# Patient Record
Sex: MALE | NOT HISPANIC OR LATINO | Employment: UNEMPLOYED | URBAN - NONMETROPOLITAN AREA
[De-identification: names, ages, dates, MRNs, and addresses within clinical notes are randomized per-mention and may not be internally consistent; named-entity substitution may affect disease eponyms.]

---
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
## 2022-08-16 ENCOUNTER — PATIENT ROUNDING (BHMG ONLY) (OUTPATIENT)
Dept: INTERNAL MEDICINE | Facility: CLINIC | Age: 22
End: 2022-08-16

## 2022-08-16 ENCOUNTER — OFFICE VISIT (OUTPATIENT)
Dept: INTERNAL MEDICINE | Facility: CLINIC | Age: 22
End: 2022-08-16

## 2022-08-16 VITALS
BODY MASS INDEX: 23.66 KG/M2 | SYSTOLIC BLOOD PRESSURE: 142 MMHG | DIASTOLIC BLOOD PRESSURE: 88 MMHG | WEIGHT: 169 LBS | HEIGHT: 71 IN | OXYGEN SATURATION: 99 % | HEART RATE: 128 BPM

## 2022-08-16 DIAGNOSIS — F41.9 ANXIETY: ICD-10-CM

## 2022-08-16 DIAGNOSIS — N52.9 ERECTILE DYSFUNCTION, UNSPECIFIED ERECTILE DYSFUNCTION TYPE: Primary | ICD-10-CM

## 2022-08-16 DIAGNOSIS — Z86.16 HISTORY OF 2019 NOVEL CORONAVIRUS DISEASE (COVID-19): ICD-10-CM

## 2022-08-16 DIAGNOSIS — R00.0 TACHYCARDIA: ICD-10-CM

## 2022-08-16 PROCEDURE — 99214 OFFICE O/P EST MOD 30 MIN: CPT | Performed by: INTERNAL MEDICINE

## 2022-08-16 RX ORDER — BUSPIRONE HYDROCHLORIDE 10 MG/1
10 TABLET ORAL 2 TIMES DAILY
Qty: 60 TABLET | Refills: 3 | Status: SHIPPED | OUTPATIENT
Start: 2022-08-16 | End: 2022-11-16 | Stop reason: SINTOL

## 2022-08-16 NOTE — PROGRESS NOTES
"Chief Complaint   Patient presents with   • Establish Care   • Erectile Dysfunction     \"Since COVID\"       History:  Gregorio Bonenr is a 21 y.o. male who presents today for evaluation of the above problems.      Presents today to establish care for erectile dysfunction.  He had COVID at the beginning of July and ever since then he noticed ED issues.  Symptoms were so bad that he went to urgent care on July 26, 2022.  He states at first he was unable to get any erection at all..  Did not have any morning erections.  Now, he is able to get partial erection.  He denies any libido change.  He is in a monogamous relationship.  He reports the relationship to be good.  He is also had issues with erectile dysfunction with masturbation.    He does report having anxiety and is very anxious at the moment.  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  Today is the first day of school.  He will be starting to study for the MCAT soon as well.  He has always been anxious.    He denies any personal history of diabetes, hypertension, TIA, stroke or heart attack.  No family history of diabetes or hypertension either.    Urgent care referred to urology, but he could not be seen because of his young age.    He denies alcohol, drug or tobacco use    HPI    Social History     Socioeconomic History   • Marital status: Single   Tobacco Use   • Smoking status: Never Smoker   • Smokeless tobacco: Never Used   Vaping Use   • Vaping Use: Never used   Substance and Sexual Activity   • Alcohol use: Never   • Drug use: Never   • Sexual activity: Yes     Partners: Female     Birth control/protection: Condom       PHQ-9 Depression Screening  Little interest or pleasure in doing things? 1-->several days   Feeling down, depressed, or hopeless? 0-->not at all   Trouble falling or staying asleep, or sleeping too much?     Feeling tired or having little energy?     Poor appetite or overeating?     Feeling bad about yourself - or that you are a " "failure or have let yourself or your family down?     Trouble concentrating on things, such as reading the newspaper or watching television?     Moving or speaking so slowly that other people could have noticed? Or the opposite - being so fidgety or restless that you have been moving around a lot more than usual?     Thoughts that you would be better off dead, or of hurting yourself in some way?     PHQ-9 Total Score 1   If you checked off any problems, how difficult have these problems made it for you to do your work, take care of things at home, or get along with other people?         PHQ-9 Total Score: 1    ROS:  Review of Systems   Constitutional: Negative.    HENT: Negative.    Respiratory: Negative.    Cardiovascular: Negative.    Gastrointestinal: Negative.    Genitourinary: Negative for dysuria, penile discharge, penile pain, penile swelling, scrotal swelling and testicular pain.         Current Outpatient Medications:   •  busPIRone (BUSPAR) 10 MG tablet, Take 1 tablet by mouth 2 (Two) Times a Day., Disp: 60 tablet, Rfl: 3    No results found for: GLUCOSE, BUN, CREATININE, EGFRIFNONA, EGFRIFAFRI, BCR, K, CO2, CALCIUM, PROTENTOTREF, ALBUMIN, LABIL2, BILIRUBIN, AST, ALT    Hemoglobin   Date Value Ref Range Status   11/14/2019 15.6 12.0 - 17.0 g/dL Final         OBJECTIVE:  Visit Vitals  /88 (BP Location: Right arm, Patient Position: Sitting, Cuff Size: Adult)   Pulse (!) 128   Ht 180.3 cm (71\")   Wt 76.7 kg (169 lb)   SpO2 99%   BMI 23.57 kg/m²      Physical Exam  Vitals and nursing note reviewed.   Constitutional:       General: He is not in acute distress.     Appearance: He is well-developed. He is not ill-appearing or toxic-appearing.   HENT:      Head: Normocephalic and atraumatic.   Eyes:      Pupils: Pupils are equal, round, and reactive to light.   Neck:      Thyroid: No thyromegaly.      Trachea: Phonation normal.   Cardiovascular:      Rate and Rhythm: Regular rhythm. Tachycardia present. "   Pulmonary:      Effort: Pulmonary effort is normal. No respiratory distress.      Breath sounds: Normal breath sounds. No wheezing, rhonchi or rales.   Abdominal:      Palpations: Abdomen is soft.      Tenderness: There is no abdominal tenderness.      Hernia: There is no hernia in the left inguinal area or right inguinal area.   Genitourinary:     Penis: Uncircumcised. No swelling.       Testes: Normal.         Right: Mass or tenderness not present.         Left: Mass or tenderness not present.   Lymphadenopathy:      Lower Body: No right inguinal adenopathy. No left inguinal adenopathy.   Skin:     General: Skin is warm and dry.      Coloration: Skin is not jaundiced or pale.      Findings: No erythema.   Neurological:      Mental Status: He is alert.   Psychiatric:         Behavior: Behavior normal.         Thought Content: Thought content normal.         Judgment: Judgment normal.         Assessment/Plan    Diagnoses and all orders for this visit:    1. Erectile dysfunction, unspecified erectile dysfunction type (Primary)  -     Comprehensive metabolic panel; Future  -     Hemoglobin A1c; Future  -     Lipid Panel; Future  -     Testosterone; Future    2. History of 2019 novel coronavirus disease (COVID-19)    3. Anxiety  -     busPIRone (BUSPAR) 10 MG tablet; Take 1 tablet by mouth 2 (Two) Times a Day.  Dispense: 60 tablet; Refill: 3    4. Tachycardia      Will obtain some blood work to try to rule out organic cause for erectile dysfunction.  I suspect anxiety to be playing a big role in his symptoms.  After further discussion with him he would be open to trying BuSpar twice a day to help with anxiety.  Tachycardia on exam likely also related to his anxiety.  I decided not to use propranolol for the anxiety given specific side effects with beta-blockers.  Denies any cardiac symptoms.    Follow-up in 3 months to recheck his anxiety      BMI is within normal parameters. No other follow-up for BMI  required.      Return in about 3 months (around 11/16/2022) for Recheck.    Patient was given instructions and counseling regarding his/her condition or for health maintenance advice. Please see specific information pulled into the AVS if appropriate.     DENISE Adams MD  10:36 CDT  8/16/2022

## 2022-08-16 NOTE — PROGRESS NOTES
August 16, 2022    Hello, may I speak with Gregorio Manriquezkeithcherie?    My name is Arielle Ma      I am  with [facility]  [address], [address]  [place] [address] [phone].    Before we get started may I verify your date of birth? 2000    I am calling to officially welcome you to our practice and ask about your recent visit. Is this a good time to talk? yes    Tell me about your visit with us. What things went well?  Dr. Adams has the experience and was pleasant. He made me feel more comfortable than other providers have.       We're always looking for ways to make our patients' experiences even better. Do you have recommendations on ways we may improve?  no    Overall were you satisfied with your first visit to our practice? yes       I appreciate you taking the time to speak with me today. Is there anything else I can do for you? no      Thank you, and have a great day.

## 2022-08-22 ENCOUNTER — LAB (OUTPATIENT)
Dept: LAB | Facility: HOSPITAL | Age: 22
End: 2022-08-22

## 2022-08-22 DIAGNOSIS — N52.9 ERECTILE DYSFUNCTION, UNSPECIFIED ERECTILE DYSFUNCTION TYPE: ICD-10-CM

## 2022-08-22 LAB
ALBUMIN SERPL-MCNC: 4.5 G/DL (ref 3.5–5.2)
ALBUMIN/GLOB SERPL: 1.6 G/DL
ALP SERPL-CCNC: 67 U/L (ref 39–117)
ALT SERPL W P-5'-P-CCNC: 49 U/L (ref 1–41)
ANION GAP SERPL CALCULATED.3IONS-SCNC: 11.6 MMOL/L (ref 5–15)
AST SERPL-CCNC: 28 U/L (ref 1–40)
BILIRUB SERPL-MCNC: 0.5 MG/DL (ref 0–1.2)
BUN SERPL-MCNC: 11 MG/DL (ref 6–20)
BUN/CREAT SERPL: 10.4 (ref 7–25)
CALCIUM SPEC-SCNC: 9.7 MG/DL (ref 8.6–10.5)
CHLORIDE SERPL-SCNC: 105 MMOL/L (ref 98–107)
CHOLEST SERPL-MCNC: 154 MG/DL (ref 0–200)
CO2 SERPL-SCNC: 25.4 MMOL/L (ref 22–29)
CREAT SERPL-MCNC: 1.06 MG/DL (ref 0.76–1.27)
EGFRCR SERPLBLD CKD-EPI 2021: 103 ML/MIN/1.73
GLOBULIN UR ELPH-MCNC: 2.8 GM/DL
GLUCOSE SERPL-MCNC: 121 MG/DL (ref 65–99)
HBA1C MFR BLD: 5.3 % (ref 4.8–5.6)
HDLC SERPL-MCNC: 30 MG/DL (ref 40–60)
LDLC SERPL CALC-MCNC: 103 MG/DL (ref 0–100)
LDLC/HDLC SERPL: 3.38 {RATIO}
POTASSIUM SERPL-SCNC: 4.1 MMOL/L (ref 3.5–5.2)
PROT SERPL-MCNC: 7.3 G/DL (ref 6–8.5)
SODIUM SERPL-SCNC: 142 MMOL/L (ref 136–145)
TESTOST SERPL-MCNC: 350 NG/DL (ref 249–836)
TRIGL SERPL-MCNC: 113 MG/DL (ref 0–150)
VLDLC SERPL-MCNC: 21 MG/DL (ref 5–40)

## 2022-08-22 PROCEDURE — 80061 LIPID PANEL: CPT

## 2022-08-22 PROCEDURE — 83036 HEMOGLOBIN GLYCOSYLATED A1C: CPT

## 2022-08-22 PROCEDURE — 84403 ASSAY OF TOTAL TESTOSTERONE: CPT

## 2022-08-22 PROCEDURE — 80053 COMPREHEN METABOLIC PANEL: CPT

## 2022-08-22 PROCEDURE — 36415 COLL VENOUS BLD VENIPUNCTURE: CPT

## 2022-11-16 ENCOUNTER — OFFICE VISIT (OUTPATIENT)
Dept: INTERNAL MEDICINE | Facility: CLINIC | Age: 22
End: 2022-11-16

## 2022-11-16 VITALS
RESPIRATION RATE: 16 BRPM | OXYGEN SATURATION: 98 % | BODY MASS INDEX: 23.38 KG/M2 | SYSTOLIC BLOOD PRESSURE: 122 MMHG | HEIGHT: 71 IN | DIASTOLIC BLOOD PRESSURE: 80 MMHG | HEART RATE: 97 BPM | WEIGHT: 167 LBS

## 2022-11-16 DIAGNOSIS — F41.9 ANXIETY: Primary | ICD-10-CM

## 2022-11-16 PROBLEM — Z86.16 HISTORY OF 2019 NOVEL CORONAVIRUS DISEASE (COVID-19): Status: RESOLVED | Noted: 2022-08-16 | Resolved: 2022-11-16

## 2022-11-16 PROCEDURE — 99213 OFFICE O/P EST LOW 20 MIN: CPT | Performed by: INTERNAL MEDICINE

## 2022-11-16 NOTE — PROGRESS NOTES
"Chief Complaint   Patient presents with   • Follow-up   • Anxiety         History:  Gregorio Bonner is a 22 y.o. male who presents today for evaluation of the above problems.      Follow-up on anxiety.  He took 1 month of BuSpar due to side effect of making him very tired.  He states his grades are good and his anxiety is good.  He has no anxiety.  Denies any depression symptoms or feeling hopeless.    Erectile dysfunction symptom has resolved as well.    His mother at the bedside did ask about over-the-counter lithium which I recommended against.    He will be studying for the MCAT soon    HPI      ROS:  Review of Systems  See above    Current Outpatient Medications:   •  busPIRone (BUSPAR) 10 MG tablet, Take 1 tablet by mouth 2 (Two) Times a Day., Disp: 60 tablet, Rfl: 3    Lab Results   Component Value Date    GLUCOSE 121 (H) 08/22/2022    BUN 11 08/22/2022    CREATININE 1.06 08/22/2022    BCR 10.4 08/22/2022    K 4.1 08/22/2022    CO2 25.4 08/22/2022    CALCIUM 9.7 08/22/2022    ALBUMIN 4.50 08/22/2022    AST 28 08/22/2022    ALT 49 (H) 08/22/2022       Hemoglobin   Date Value Ref Range Status   11/14/2019 15.6 12.0 - 17.0 g/dL Final         OBJECTIVE:  Visit Vitals  /80 (BP Location: Left arm, Patient Position: Sitting, Cuff Size: Adult)   Pulse 97   Resp 16   Ht 180.3 cm (70.98\")   Wt 75.8 kg (167 lb)   SpO2 98%   BMI 23.30 kg/m²      Physical Exam  Vitals and nursing note reviewed.   Constitutional:       General: He is not in acute distress.     Appearance: He is well-developed. He is not ill-appearing or toxic-appearing.   HENT:      Head: Normocephalic and atraumatic.   Eyes:      Pupils: Pupils are equal, round, and reactive to light.   Neck:      Thyroid: No thyromegaly.      Trachea: Phonation normal.   Pulmonary:      Effort: No respiratory distress.   Skin:     Coloration: Skin is not pale.      Findings: No erythema.   Neurological:      Mental Status: He is alert.   Psychiatric:         " Behavior: Behavior normal.         Thought Content: Thought content normal.         Judgment: Judgment normal.         Assessment/Plan    Diagnoses and all orders for this visit:    1. Anxiety (Primary)      He had side effects to BuSpar, but fortunately the anxiety has improved.  His erectile dysfunction symptoms have resolved.    He does not need any prescription medication for anxiety, but if he starts to feel anxious he could try ashwagandha.    Follow-up in 1 year for an annual physical or sooner if indicated.    Return in about 1 year (around 11/16/2023) for Annual physical.      DENISE Adams MD  15:05 CST  11/16/2022

## 2023-11-20 ENCOUNTER — OFFICE VISIT (OUTPATIENT)
Dept: INTERNAL MEDICINE | Facility: CLINIC | Age: 23
End: 2023-11-20
Payer: COMMERCIAL

## 2023-11-20 VITALS
BODY MASS INDEX: 26.88 KG/M2 | HEIGHT: 71 IN | WEIGHT: 192 LBS | OXYGEN SATURATION: 98 % | DIASTOLIC BLOOD PRESSURE: 80 MMHG | SYSTOLIC BLOOD PRESSURE: 130 MMHG | TEMPERATURE: 97.9 F | HEART RATE: 94 BPM

## 2023-11-20 DIAGNOSIS — Z00.00 ENCOUNTER FOR PREVENTIVE CARE: Primary | ICD-10-CM

## 2023-11-20 DIAGNOSIS — Z13.6 HYPERTENSION SCREEN: ICD-10-CM

## 2023-11-20 DIAGNOSIS — Z13.31 DEPRESSION SCREEN: ICD-10-CM

## 2023-11-20 DIAGNOSIS — Z23 NEED FOR INFLUENZA VACCINATION: ICD-10-CM

## 2023-11-20 DIAGNOSIS — E66.3 OVERWEIGHT (BMI 25.0-29.9): ICD-10-CM

## 2023-11-20 NOTE — PROGRESS NOTES
Chief Complaint   Patient presents with    Annual Exam         History:  Gregorio Bonner is a 23 y.o. male who presents today for evaluation of the above problems.      Gregorio presents today for his annual physical.  Overall, he is doing very well.    He is up-to-date on his dental exam but is in need of an eye exam.    Denies any tobacco use, recreational drug use or alcohol use.    He states that he could be more active.  His diet is okay, but states he could be better.  He has gained about 25 pounds while studying for the [employer], which has not come off.    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    He is not having any depression or anxiety symptoms.    There is no new family history, social history, surgical history or allergies    HPI    Social History     Socioeconomic History    Marital status: Single   Tobacco Use    Smoking status: Never    Smokeless tobacco: Never   Vaping Use    Vaping Use: Never used   Substance and Sexual Activity    Alcohol use: Never    Drug use: Never    Sexual activity: Yes     Partners: Female     Birth control/protection: Condom       PHQ-9 Depression Screening  Little interest or pleasure in doing things? 0-->not at all   Feeling down, depressed, or hopeless? 0-->not at all   Trouble falling or staying asleep, or sleeping too much?     Feeling tired or having little energy?     Poor appetite or overeating?     Feeling bad about yourself - or that you are a failure or have let yourself or your family down?     Trouble concentrating on things, such as reading the newspaper or watching television?     Moving or speaking so slowly that other people could have noticed? Or the opposite - being so fidgety or restless that you have been moving around a lot more than usual?     Thoughts that you would be better off dead, or of hurting yourself in some way?     PHQ-9 Total Score 0   If you checked off any problems, how difficult have these problems made  "it for you to do your work, take care of things at home, or get along with other people?         PHQ-9 Total Score: 0    ROS:  Review of Systems   Constitutional:  Negative for activity change, appetite change, fatigue, fever and unexpected weight change.   HENT: Negative.     Eyes: Negative.    Respiratory:  Negative for cough, chest tightness and shortness of breath.    Cardiovascular:  Negative for chest pain, palpitations and leg swelling.   Gastrointestinal:  Negative for abdominal pain, constipation, diarrhea, nausea and vomiting.   Endocrine: Negative for cold intolerance and heat intolerance.   Genitourinary: Negative.    Musculoskeletal: Negative.  Negative for back pain, neck pain and neck stiffness.   Skin:  Negative for rash and wound.   Neurological:  Negative for dizziness, syncope, weakness, light-headedness and headaches.   Hematological:  Negative for adenopathy. Does not bruise/bleed easily.   Psychiatric/Behavioral:  Negative for agitation, behavioral problems and confusion.        No current outpatient medications on file.    Lab Results   Component Value Date    GLUCOSE 121 (H) 08/22/2022    BUN 11 08/22/2022    CREATININE 1.06 08/22/2022    EGFR 103.0 08/22/2022    BCR 10.4 08/22/2022    K 4.1 08/22/2022    CO2 25.4 08/22/2022    CALCIUM 9.7 08/22/2022    ALBUMIN 4.50 08/22/2022    BILITOT 0.5 08/22/2022    AST 28 08/22/2022    ALT 49 (H) 08/22/2022       Hemoglobin   Date Value Ref Range Status   11/14/2019 15.6 12.0 - 17.0 g/dL Final         OBJECTIVE:  Visit Vitals  /80 (BP Location: Left arm, Patient Position: Sitting, Cuff Size: Adult)   Pulse 94   Temp 97.9 °F (36.6 °C) (Temporal)   Ht 180.3 cm (71\")   Wt 87.1 kg (192 lb)   SpO2 98%   BMI 26.78 kg/m²      Physical Exam  Vitals and nursing note reviewed.   Constitutional:       General: He is not in acute distress.     Appearance: Normal appearance. He is well-developed. He is not ill-appearing.      Comments: Very pleasant   HENT: "      Head: Normocephalic and atraumatic.      Mouth/Throat:      Pharynx: No oropharyngeal exudate.   Eyes:      General: No scleral icterus.     Conjunctiva/sclera: Conjunctivae normal.      Pupils: Pupils are equal, round, and reactive to light.   Neck:      Thyroid: No thyromegaly.      Vascular: No JVD.   Cardiovascular:      Rate and Rhythm: Normal rate and regular rhythm.      Heart sounds: Normal heart sounds. No murmur heard.     No friction rub. No gallop.   Pulmonary:      Effort: Pulmonary effort is normal. No respiratory distress.      Breath sounds: Normal breath sounds. No stridor. No wheezing, rhonchi or rales.   Abdominal:      General: Bowel sounds are normal. There is no distension.      Palpations: Abdomen is soft.      Tenderness: There is no abdominal tenderness.   Musculoskeletal:      Right lower leg: No edema.      Left lower leg: No edema.   Skin:     General: Skin is warm and dry.      Coloration: Skin is not jaundiced.   Neurological:      Mental Status: He is alert.      Cranial Nerves: No cranial nerve deficit.   Psychiatric:         Mood and Affect: Mood normal.         Behavior: Behavior normal.         Thought Content: Thought content normal.         Judgment: Judgment normal.         Assessment/Plan    Diagnoses and all orders for this visit:    1. Encounter for preventive care (Primary)    2. Depression screen    3. Hypertension screen    4. Overweight (BMI 25.0-29.9)    5. Need for influenza vaccination  -     Fluzone >6 Months ([id number])      Overall, Gregorio is doing very well.  Depression screen negative with PHQ 2 of 0.  Hypertension screen was negative with a blood pressure 130/80.    Influenza vaccine given today in office.    His weight has increased since last visit.  Recommend trying to increase activity and watch his diet.      Counseling/Anticipatory Guidance Discussed: nutrition, physical activity, healthy weight, misuse of tobacco, alcohol and drugs, dental health,  mental health, and immunizations    BMI is >= 25 and <30. (Overweight) The following options were offered after discussion;: weight loss educational material (shared in after visit summary), exercise counseling/recommendations, and nutrition counseling/recommendations      Return in about 1 year (around 11/20/2024) for Annual physical.    Patient was given instructions and counseling regarding his/her condition or for health maintenance advice. Please see specific information pulled into the AVS if appropriate.     DENISE Adams MD  13:05 CST  11/20/2023   Electronically signed

## 2024-03-18 ENCOUNTER — TELEPHONE (OUTPATIENT)
Dept: INTERNAL MEDICINE | Facility: CLINIC | Age: 24
End: 2024-03-18
Payer: COMMERCIAL

## 2024-03-18 DIAGNOSIS — Z11.1 SCREENING-PULMONARY TB: Primary | ICD-10-CM

## 2024-03-18 NOTE — TELEPHONE ENCOUNTER
Wondering if he can get lab work done for TB test, instead of doing skin test. 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

## 2024-11-22 ENCOUNTER — OFFICE VISIT (OUTPATIENT)
Dept: INTERNAL MEDICINE | Facility: CLINIC | Age: 24
End: 2024-11-22
Payer: COMMERCIAL

## 2024-11-22 VITALS
DIASTOLIC BLOOD PRESSURE: 80 MMHG | BODY MASS INDEX: 28.19 KG/M2 | TEMPERATURE: 98 F | HEIGHT: 71 IN | WEIGHT: 201.4 LBS | SYSTOLIC BLOOD PRESSURE: 126 MMHG | HEART RATE: 85 BPM | OXYGEN SATURATION: 98 %

## 2024-11-22 DIAGNOSIS — Z00.00 ENCOUNTER FOR PREVENTIVE CARE: Primary | ICD-10-CM

## 2024-11-22 DIAGNOSIS — Z23 NEED FOR IMMUNIZATION AGAINST INFLUENZA: ICD-10-CM

## 2024-11-22 DIAGNOSIS — Z13.6 HYPERTENSION SCREEN: ICD-10-CM

## 2024-11-22 DIAGNOSIS — E66.3 OVERWEIGHT (BMI 25.0-29.9): ICD-10-CM

## 2024-11-22 DIAGNOSIS — Z13.31 DEPRESSION SCREEN: ICD-10-CM

## 2024-11-22 RX ORDER — MINOXIDIL 2.5 MG/1
2.5 TABLET ORAL DAILY
COMMUNITY

## 2024-11-22 NOTE — PROGRESS NOTES
Chief Complaint   Patient presents with    Annual Exam         History:  Gregorio Bonner is a 24 y.o. male who presents today for evaluation of the above problems.      Gregorio presents today for his annual physical.  Overall, he is doing very well.    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    He is up-to-date on his dental exam and eye exam.    Denies any tobacco use, recreational drug use or alcohol use.    He states that he could be more active.  His diet is okay, but states he could be better.        He is not having any depression or anxiety symptoms.  He has had some situational issues with his girlfriend recently breaking up with him.  However, he does not feel overtly depressed.    There is no new family history, social history, surgical history or allergies    HPI    Social History     Socioeconomic History    Marital status: Single   Tobacco Use    Smoking status: Never    Smokeless tobacco: Never   Vaping Use    Vaping status: Never Used   Substance and Sexual Activity    Alcohol use: Never    Drug use: Never    Sexual activity: Yes     Partners: Female     Birth control/protection: Condom       PHQ-9 Depression Screening  Little interest or pleasure in doing things? Not at all   Feeling down, depressed, or hopeless? Not at all   PHQ-2 Total Score 0   Trouble falling or staying asleep, or sleeping too much?     Feeling tired or having little energy?     Poor appetite or overeating?     Feeling bad about yourself - or that you are a failure or have let yourself or your family down?     Trouble concentrating on things, such as reading the newspaper or watching television?     Moving or speaking so slowly that other people could have noticed? Or the opposite - being so fidgety or restless that you have been moving around a lot more than usual?     Thoughts that you would be better off dead, or of hurting yourself in some way?     PHQ-9 Total Score     If you checked off any  "problems, how difficult have these problems made it for you to do your work, take care of things at home, or get along with other people? Not difficult at all         ROS:  Review of Systems   Constitutional:  Negative for activity change, appetite change, fatigue, fever and unexpected weight change.   HENT: Negative.     Eyes: Negative.    Respiratory:  Negative for cough, chest tightness and shortness of breath.    Cardiovascular:  Negative for chest pain, palpitations and leg swelling.   Gastrointestinal:  Negative for abdominal pain, constipation, diarrhea, nausea and vomiting.   Endocrine: Negative for cold intolerance and heat intolerance.   Genitourinary: Negative.    Musculoskeletal: Negative.  Negative for back pain, neck pain and neck stiffness.   Skin:  Negative for rash and wound.   Neurological:  Negative for dizziness, syncope, weakness, light-headedness and headaches.   Hematological:  Negative for adenopathy. Does not bruise/bleed easily.   Psychiatric/Behavioral:  Negative for agitation, behavioral problems and confusion.          Current Outpatient Medications:     minoxidil (LONITEN) 2.5 MG tablet, Take 1 tablet by mouth Daily., Disp: , Rfl:     Lab Results   Component Value Date    GLUCOSE 121 (H) 08/22/2022    BUN 11 08/22/2022    CREATININE 1.06 08/22/2022    EGFR 103.0 08/22/2022    BCR 10.4 08/22/2022    K 4.1 08/22/2022    CO2 25.4 08/22/2022    CALCIUM 9.7 08/22/2022    ALBUMIN 4.50 08/22/2022    BILITOT 0.5 08/22/2022    AST 28 08/22/2022    ALT 49 (H) 08/22/2022       Hemoglobin   Date Value Ref Range Status   11/14/2019 15.6 12.0 - 17.0 g/dL Final         OBJECTIVE:  Visit Vitals  /80 (BP Location: Left arm, Patient Position: Sitting, Cuff Size: Adult)   Pulse 85   Temp 98 °F (36.7 °C) (Temporal)   Ht 180.3 cm (71\")   Wt 91.4 kg (201 lb 6.4 oz)   SpO2 98%   BMI 28.09 kg/m²      Physical Exam  Vitals and nursing note reviewed.   Constitutional:       General: He is not in acute " distress.     Appearance: Normal appearance. He is well-developed. He is not ill-appearing.      Comments: Very pleasant   HENT:      Head: Normocephalic and atraumatic.      Mouth/Throat:      Pharynx: No oropharyngeal exudate.   Eyes:      General: No scleral icterus.     Conjunctiva/sclera: Conjunctivae normal.      Pupils: Pupils are equal, round, and reactive to light.   Neck:      Thyroid: No thyromegaly.      Vascular: No JVD.   Cardiovascular:      Rate and Rhythm: Normal rate and regular rhythm.      Heart sounds: Normal heart sounds. No murmur heard.     No friction rub. No gallop.   Pulmonary:      Effort: Pulmonary effort is normal. No respiratory distress.      Breath sounds: Normal breath sounds. No stridor. No wheezing, rhonchi or rales.   Abdominal:      General: Bowel sounds are normal. There is no distension.      Palpations: Abdomen is soft.      Tenderness: There is no abdominal tenderness.   Musculoskeletal:      Right lower leg: No edema.      Left lower leg: No edema.   Skin:     General: Skin is warm and dry.      Coloration: Skin is not jaundiced.   Neurological:      Mental Status: He is alert.      Cranial Nerves: No cranial nerve deficit.   Psychiatric:         Mood and Affect: Mood normal.         Behavior: Behavior normal.         Thought Content: Thought content normal.         Judgment: Judgment normal.         Assessment/Plan    Diagnoses and all orders for this visit:    1. Encounter for preventive care (Primary)    2. Need for immunization against influenza  -     Fluzone >6mos ([id number])    3. Depression screen    4. Hypertension screen    5. Overweight (BMI 25.0-29.9)      Overall, Gregorio is doing very well.  Depression screen negative with PHQ 2 of 0.  Hypertension screen was negative with a blood pressure 126/80.    Influenza vaccine given today in office today.  He tolerated this well without immediate side effects or issues.    His weight has increased since last visit.   Recommend trying to increase activity and watch his diet.    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  I reviewed Dr. Julio C Neely's office note from November 14, 2019 and printed this off as it did have his childhood immunizations listed.    Counseling/Anticipatory Guidance Discussed: nutrition, physical activity, healthy weight, misuse of tobacco, alcohol and drugs, dental health, mental health, and immunizations    BMI is >= 25 and <30. (Overweight) The following options were offered after discussion;: weight loss educational material (shared in after visit summary), exercise counseling/recommendations, and nutrition counseling/recommendations      Return in about 1 year (around 11/22/2025) for Annual physical.    Patient was given instructions and counseling regarding his/her condition or for health maintenance advice. Please see specific information pulled into the AVS if appropriate.     DENISE Adams MD  13:05 CST  11/22/2024   Electronically signed

## 2025-06-07 ENCOUNTER — OFFICE VISIT (OUTPATIENT)
Age: 25
End: 2025-06-07

## 2025-06-07 VITALS
DIASTOLIC BLOOD PRESSURE: 78 MMHG | HEIGHT: 71 IN | WEIGHT: 180.4 LBS | RESPIRATION RATE: 20 BRPM | SYSTOLIC BLOOD PRESSURE: 122 MMHG | BODY MASS INDEX: 25.26 KG/M2 | OXYGEN SATURATION: 98 % | HEART RATE: 105 BPM | TEMPERATURE: 98.3 F

## 2025-06-07 DIAGNOSIS — R21 RASH: ICD-10-CM

## 2025-06-07 DIAGNOSIS — B08.1 MOLLUSCUM CONTAGIOSUM: Primary | ICD-10-CM

## 2025-06-07 RX ORDER — PODOFILOX 5 MG/G
GEL TOPICAL
Qty: 1 EACH | Refills: 1 | Status: SHIPPED | OUTPATIENT
Start: 2025-06-07 | End: 2025-06-17

## 2025-06-07 RX ORDER — TRIAMCINOLONE ACETONIDE 1 MG/G
CREAM TOPICAL
Qty: 80 G | Refills: 0 | Status: SHIPPED | OUTPATIENT
Start: 2025-06-07

## 2025-06-07 ASSESSMENT — ENCOUNTER SYMPTOMS
DIARRHEA: 0
ABDOMINAL PAIN: 0
WHEEZING: 0
RHINORRHEA: 0
CHEST TIGHTNESS: 0
VOMITING: 0
SINUS PRESSURE: 0
APNEA: 0
SORE THROAT: 0
COUGH: 0
NAUSEA: 0
SHORTNESS OF BREATH: 0
ABDOMINAL DISTENTION: 0
CONSTIPATION: 0
SINUS PAIN: 0
EYE PAIN: 0
EYE DISCHARGE: 0
EYE ITCHING: 0
COLOR CHANGE: 0
TROUBLE SWALLOWING: 0

## 2025-06-07 NOTE — PROGRESS NOTES
[facility], [facility] ([place])  [facility]  [address]  [place] [address]  Dept: [phone]  Dept Fax: [phone]    Yrn Merida is a 24 y.o. male who presents today for his medical conditions/complaints as noted below.  Yrn Merida is c/o of Skin Problem (Pt reports what he describes as bumps about a month ago. Pt describes them as lesions. Pt states they have spread to entire body and his girlfriend is developing them too, pt is concerned for HSV 2- HPV. )        HPI:     Yrn Merida presents with complaints of a rash. Patient states about a month ago, he started noticing some bumps on his feet. He states now the bumps have started spreading all over his entire body, including his perineum. The rash is not painful, but it is itchy after it is touched. He states he is now concerned because the bumps have now spread to his girlfriend. He denies any other symptoms. He has tried applying a steroid cream and it has helped with the itching, but not made the rash go away. He states the rash will last a couple of days, then it will go away and pop up in various different spots. He is nervous that it could be STI related since it has transferred to his girlfriend. He does not appear to be in distress. He is stable at this time.     Denies any recent antibiotic or steroid administration.      History reviewed. No pertinent past medical history.  History reviewed. No pertinent surgical history.    History reviewed. No pertinent family history.    Social History     Tobacco Use    Smoking status: Never    Smokeless tobacco: Never   Substance Use Topics    Alcohol use: Yes     Comment: occasionally      Current Outpatient Medications   Medication Sig Dispense Refill    triamcinolone (KENALOG) 0.1 % cream Apply topically 4 times daily as needed for itching. 80 g 0    podofilox (CONDYLOX) 0.5 % gel Apply topically 2 times daily for 3 consecutive days per week. Continue applying for

## 2025-06-07 NOTE — PATIENT INSTRUCTIONS
Steroid cream sent to pharmacy; apply as directed.  Podophyllotoxin sent to pharmacy; apply as directed.  HSV labs pending; will call when resulted.  Don't share personal items, like towels, razors, hairbrushes, and washcloths. And don't share clothing or any type of sports gear, like helmets or goggles.  Keep the bumps covered with a bandage, medical tape, or clothing when around other people. Use waterproof bandages when swimming or playing sports.  Take steps to avoid spreading the bumps to other parts of the body. For example, avoid shaving near the bumps. And don't scratch or try to remove the bumps.  Do not have sex if you have bumps in your genital area. Your doctor may recommend treatment to help you avoid spreading them to any sex partners.  If your doctor prescribes medicine, use it exactly as directed.  Return to the clinic or follow up with PCP if symptoms worsen or fail to improve.

## 2025-06-09 ENCOUNTER — RESULTS FOLLOW-UP (OUTPATIENT)
Age: 25
End: 2025-06-09

## 2025-06-09 LAB
HSV1 GG IGG SER-ACNC: 0.07 IV
HSV2 GG IGG SER-ACNC: 0.03 IV

## 2025-06-23 ENCOUNTER — LAB (OUTPATIENT)
Dept: LAB | Facility: HOSPITAL | Age: 25
End: 2025-06-23
Payer: COMMERCIAL

## 2025-06-23 ENCOUNTER — OFFICE VISIT (OUTPATIENT)
Dept: INTERNAL MEDICINE | Facility: CLINIC | Age: 25
End: 2025-06-23
Payer: COMMERCIAL

## 2025-06-23 VITALS
HEIGHT: 71 IN | HEART RATE: 88 BPM | DIASTOLIC BLOOD PRESSURE: 80 MMHG | OXYGEN SATURATION: 98 % | BODY MASS INDEX: 28.09 KG/M2 | SYSTOLIC BLOOD PRESSURE: 126 MMHG

## 2025-06-23 DIAGNOSIS — Z01.84 IMMUNITY STATUS TESTING: ICD-10-CM

## 2025-06-23 DIAGNOSIS — Z01.84 IMMUNITY STATUS TESTING: Primary | ICD-10-CM

## 2025-06-23 DIAGNOSIS — L25.9 CONTACT DERMATITIS, UNSPECIFIED CONTACT DERMATITIS TYPE, UNSPECIFIED TRIGGER: ICD-10-CM

## 2025-06-23 PROCEDURE — 86317 IMMUNOASSAY INFECTIOUS AGENT: CPT

## 2025-06-23 PROCEDURE — 36415 COLL VENOUS BLD VENIPUNCTURE: CPT

## 2025-06-23 PROCEDURE — 99213 OFFICE O/P EST LOW 20 MIN: CPT

## 2025-06-23 RX ORDER — TRIAMCINOLONE ACETONIDE 1 MG/G
CREAM TOPICAL 2 TIMES DAILY
Qty: 80 G | Refills: 0 | Status: SHIPPED | OUTPATIENT
Start: 2025-06-23

## 2025-06-23 RX ORDER — PODOFILOX 5 MG/G
GEL TOPICAL
COMMUNITY
Start: 2025-06-09

## 2025-06-23 RX ORDER — TRIAMCINOLONE ACETONIDE 1 MG/G
CREAM TOPICAL
COMMUNITY
Start: 2025-06-07 | End: 2025-06-23 | Stop reason: SDUPTHER

## 2025-06-23 NOTE — PROGRESS NOTES
Chief Complaint   Patient presents with    Labs Only     Needs hep b titer     Itching       History:      Gregorio Bonner is a 24 y.o. male who presents today for evaluation of the above problems.      HPI  History of Present Illness  The patient is a 24-year-old male who presents for evaluation of itching.    He has been experiencing pruritus since the beginning of May, which was initially diagnosed as molluscum at [facility]. The condition is characterized by intermittent flare-ups and widespread distribution across his body. He reports that his mother, sister, and girlfriend have also developed similar symptoms. He describes the lesions as flat areas of erythema while others are pustule-like, with some appearing more raised and pearly than others. He has refrained from manipulating the lesions. He has been applying a topical cream for the past 1.5 weeks, which provides some relief from the itching. He is uncertain about the efficacy of the steroid cream, as he has not used it consistently. He reports no recent changes in his use of detergents or soaps. He has an upcoming appointment with a dermatologist on 07/17/2025.    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      ROS:  Review of Systems   Skin:  Positive for rash.         Current Outpatient Medications:     podofilox (CONDYLOX) 0.5 % gel, Apply  topically to the appropriate area as directed., Disp: , Rfl:     triamcinolone (KENALOG) 0.1 % cream, Apply  topically to the appropriate area as directed 2 (Two) Times a Day., Disp: 80 g, Rfl: 0    minoxidil (LONITEN) 2.5 MG tablet, Take 1 tablet by mouth Daily., Disp: , Rfl:     Lab Results   Component Value Date    GLUCOSE 121 (H) 08/22/2022    BUN 11 08/22/2022    CREATININE 1.06 08/22/2022     08/22/2022    K 4.1 08/22/2022     08/22/2022    CALCIUM 9.7 08/22/2022    PROTEINTOT 7.3 08/22/2022    ALBUMIN 4.50 08/22/2022    ALT 49 (H) 08/22/2022    AST 28 08/22/2022    ALKPHOS 67  "08/22/2022    BILITOT 0.5 08/22/2022    GLOB 2.8 08/22/2022    AGRATIO 1.6 08/22/2022    BCR 10.4 08/22/2022    ANIONGAP 11.6 08/22/2022    EGFR 103.0 08/22/2022       Hemoglobin   Date Value Ref Range Status   11/14/2019 15.6 12.0 - 17.0 g/dL Final       OBJECTIVE:  Visit Vitals  /80 (BP Location: Right arm, Patient Position: Sitting, Cuff Size: Adult)   Pulse 88   Ht 180.3 cm (71\")   SpO2 98%   BMI 28.09 kg/m²      Physical Exam  Constitutional:       Appearance: Normal appearance.   HENT:      Head: Normocephalic.   Skin:     General: Skin is warm and dry.          Neurological:      Mental Status: He is alert and oriented to person, place, and time.   Psychiatric:         Mood and Affect: Mood normal.         Behavior: Behavior normal.         Thought Content: Thought content normal.         Judgment: Judgment normal.       Physical Exam  Respiratory: Clear to auscultation, no wheezing, rales or rhonchi  Skin: Multiple areas of erythematous, pruritic rash consistent with contact dermatitis    Results      Assessment/Plan    Diagnoses and all orders for this visit:    1. Immunity status testing (Primary)  -     Hepatitis B Surf Antibody Quant; Future    2. Contact dermatitis, unspecified contact dermatitis type, unspecified trigger  -     triamcinolone (KENALOG) 0.1 % cream; Apply  topically to the appropriate area as directed 2 (Two) Times a Day.  Dispense: 80 g; Refill: 0      Assessment & Plan  1. Pruritus.  - Symptoms include itching and flat lesions, which do not align with molluscum.  - Physical exam findings suggest contact dermatitis rather than molluscum.  - Referral to Dr. Jerry Cobb, dermatologist, for further evaluation.  - Prescription for triamcinolone refilled and sent to pharmacy.    2. Hepatitis B titer.  - Hepatitis B titer requested for medical school requirements.  - Lab test ordered and results will be communicated upon receipt.      Return if symptoms worsen or fail to " improve.      CHUCK Hendricks  09:39 CDT  6/23/2025   Electronically signed      Patient or patient representative verbalized consent for the use of Ambient Listening during the visit with  CHUCK Hendricks for chart documentation. 6/23/2025  11:59 CDT

## 2025-06-24 LAB — HBV SURFACE AB SER-ACNC: 751 MIU/ML
